# Patient Record
Sex: MALE | Race: WHITE | Employment: FULL TIME | URBAN - METROPOLITAN AREA
[De-identification: names, ages, dates, MRNs, and addresses within clinical notes are randomized per-mention and may not be internally consistent; named-entity substitution may affect disease eponyms.]

---

## 2024-06-28 ENCOUNTER — APPOINTMENT (OUTPATIENT)
Age: 29
End: 2024-06-28

## 2024-06-28 ENCOUNTER — HOSPITAL ENCOUNTER (EMERGENCY)
Age: 29
Discharge: HOME | End: 2024-06-28
Attending: EMERGENCY MEDICINE

## 2024-06-28 VITALS
DIASTOLIC BLOOD PRESSURE: 93 MMHG | HEART RATE: 89 BPM | HEIGHT: 69 IN | WEIGHT: 183 LBS | OXYGEN SATURATION: 97 % | SYSTOLIC BLOOD PRESSURE: 143 MMHG | BODY MASS INDEX: 27.11 KG/M2 | RESPIRATION RATE: 18 BRPM | TEMPERATURE: 98.4 F

## 2024-06-28 DIAGNOSIS — M25.532 WRIST PAIN, ACUTE, LEFT: Primary | ICD-10-CM

## 2024-06-28 PROCEDURE — 99283 EMERGENCY DEPT VISIT LOW MDM: CPT

## 2024-06-28 PROCEDURE — 73120 X-RAY EXAM OF HAND: CPT

## 2024-06-28 PROCEDURE — 73110 X-RAY EXAM OF WRIST: CPT

## 2024-06-28 ASSESSMENT — LIFESTYLE VARIABLES
HOW MANY STANDARD DRINKS CONTAINING ALCOHOL DO YOU HAVE ON A TYPICAL DAY: 3 OR 4
HOW OFTEN DO YOU HAVE A DRINK CONTAINING ALCOHOL: 2-3 TIMES A WEEK

## 2024-06-28 ASSESSMENT — PAIN - FUNCTIONAL ASSESSMENT: PAIN_FUNCTIONAL_ASSESSMENT: 0-10

## 2024-06-28 ASSESSMENT — PAIN DESCRIPTION - DESCRIPTORS: DESCRIPTORS: SHARP;SHOOTING

## 2024-06-28 ASSESSMENT — PAIN SCALES - GENERAL: PAINLEVEL_OUTOF10: 10

## 2024-06-28 ASSESSMENT — PAIN DESCRIPTION - LOCATION: LOCATION: WRIST

## 2024-06-28 ASSESSMENT — PAIN DESCRIPTION - ORIENTATION: ORIENTATION: LEFT

## 2024-06-29 NOTE — ED PROVIDER NOTES
I PERSONALLY SAW THE PATIENT AND PERFORMED A SUBSTANTIVE PORTION OF THE VISIT INCLUDING ALL ASPECTS OF THE MEDICAL DECISION MAKING PROCESS.    [facility] EMERGENCY DEPT  EMERGENCY DEPARTMENT ENCOUNTER      Pt Name: Kyaw Fowler  MRN: [id number]  Birthdate 1995  Date of evaluation: 6/28/2024  Provider: Amando Parmar MD    CHIEF COMPLAINT       Chief Complaint   Patient presents with    Wrist Injury     Patient was skateboarding last night and fell and injured left wrist. Patient denies hitting head.        HISTORY OF PRESENT ILLNESS    Kyaw Fowler is a 29 y.o. male who presents to the emergency department with left wrist pain.  Patient was skateboarding last night and fell on his wrist.  10 of 10 pain to the left wrist.  No other associated symptoms.  No rash.  No leg swelling.  No back pain.    Nursing Notes were reviewed. Including nursing noted for FM, Surgical History, Past Medical History, Social History, vitals, and allergies; agree with all.     REVIEW OF SYSTEMS       Review of Systems    Except as noted above the remainder of the review of systems was reviewed and negative.     PAST MEDICAL HISTORY   History reviewed. No pertinent past medical history.    SURGICAL HISTORY     History reviewed. No pertinent surgical history.    CURRENT MEDICATIONS       Previous Medications    No medications on file       ALLERGIES     Patient has no known allergies.    FAMILY HISTORY      History reviewed. No pertinent family history.    SOCIAL HISTORY       Social History     Socioeconomic History    Marital status: Single     Spouse name: None    Number of children: None    Years of education: None    Highest education level: None   Tobacco Use    Smoking status: Never     Passive exposure: Never    Smokeless tobacco: Never   Substance and Sexual Activity    Alcohol use: Yes     Comment: social    Drug use: Yes     Types: Marijuana (Weed)     Comment: occ       PHYSICAL EXAM       ED Triage Vitals  [06/28/24 2048]   BP Temp Temp Source Pulse Respirations SpO2 Height Weight - Scale   (!) 143/93 98.4 °F (36.9 °C) Oral 89 18 97 % 1.753 m (5' 9\") 83 kg (183 lb)       Physical Exam  Vitals and nursing note reviewed.   Constitutional:       General: He is not in acute distress.     Appearance: He is well-developed. He is not diaphoretic.   HENT:      Head: Normocephalic and atraumatic.   Eyes:      General:         Right eye: No discharge.         Left eye: No discharge.      Pupils: Pupils are equal, round, and reactive to light.   Neck:      Thyroid: No thyromegaly.      Trachea: No tracheal deviation.   Cardiovascular:      Rate and Rhythm: Normal rate and regular rhythm.      Heart sounds: No murmur heard.  Pulmonary:      Breath sounds: No wheezing or rales.   Chest:      Chest wall: No tenderness.   Abdominal:      General: There is no distension.      Palpations: Abdomen is soft. There is no mass.      Tenderness: There is no abdominal tenderness. There is no guarding or rebound.   Musculoskeletal:         General: Tenderness present. No deformity. Normal range of motion.      Cervical back: Normal range of motion.      Comments: 5 out of 5  strength.  Well-appearing neurovascular tact of the   Skin:     General: Skin is warm.      Coloration: Skin is not pale.      Findings: No erythema or rash.   Neurological:      Mental Status: He is alert.      Cranial Nerves: No cranial nerve deficit.      Motor: No abnormal muscle tone.      Coordination: Coordination normal.         DIAGNOSTIC RESULTS     EKG: All EKG's are interpreted by the Emergency Department Physician who either signs or Co-signs this chart in the absence of acardiologist.    Interpreted by myself     RADIOLOGY:   Non-plain film images such as CT, Ultrasoundand MRI are read by the radiologist. Plain radiographic images are visualized and preliminarily interpreted by the emergency physician with the below findings:    X-ray no fracture    ED

## 2024-07-05 ENCOUNTER — APPOINTMENT (OUTPATIENT)
Age: 29
End: 2024-07-05

## 2024-07-05 ENCOUNTER — TELEPHONE (OUTPATIENT)
Dept: ORTHOPEDIC SURGERY | Age: 29
End: 2024-07-05

## 2024-07-05 ENCOUNTER — HOSPITAL ENCOUNTER (EMERGENCY)
Age: 29
Discharge: HOME | End: 2024-07-05
Attending: STUDENT IN AN ORGANIZED HEALTH CARE EDUCATION/TRAINING PROGRAM

## 2024-07-05 VITALS
OXYGEN SATURATION: 98 % | RESPIRATION RATE: 16 BRPM | SYSTOLIC BLOOD PRESSURE: 131 MMHG | DIASTOLIC BLOOD PRESSURE: 75 MMHG | TEMPERATURE: 98 F | BODY MASS INDEX: 27.02 KG/M2 | HEIGHT: 69 IN | HEART RATE: 92 BPM

## 2024-07-05 DIAGNOSIS — M25.511 ACUTE PAIN OF RIGHT SHOULDER: Primary | ICD-10-CM

## 2024-07-05 DIAGNOSIS — S43.101A SEPARATION OF RIGHT ACROMIOCLAVICULAR JOINT, INITIAL ENCOUNTER: ICD-10-CM

## 2024-07-05 PROCEDURE — 99283 EMERGENCY DEPT VISIT LOW MDM: CPT

## 2024-07-05 PROCEDURE — 73030 X-RAY EXAM OF SHOULDER: CPT

## 2024-07-05 RX ORDER — HYDROCODONE BITARTRATE AND ACETAMINOPHEN 5; 325 MG/1; MG/1
1 TABLET ORAL EVERY 4 HOURS PRN
COMMUNITY
Start: 2024-07-04 | End: 2024-07-07

## 2024-07-05 RX ORDER — OXYCODONE HYDROCHLORIDE AND ACETAMINOPHEN 5; 325 MG/1; MG/1
1 TABLET ORAL ONCE
Status: DISCONTINUED | OUTPATIENT
Start: 2024-07-05 | End: 2024-07-05 | Stop reason: HOSPADM

## 2024-07-05 RX ORDER — KETOROLAC TROMETHAMINE 15 MG/ML
15 INJECTION, SOLUTION INTRAMUSCULAR; INTRAVENOUS ONCE
Status: DISCONTINUED | OUTPATIENT
Start: 2024-07-05 | End: 2024-07-05 | Stop reason: HOSPADM

## 2024-07-05 ASSESSMENT — PAIN DESCRIPTION - PAIN TYPE: TYPE: ACUTE PAIN

## 2024-07-05 ASSESSMENT — PAIN SCALES - GENERAL: PAINLEVEL_OUTOF10: 5

## 2024-07-05 ASSESSMENT — PAIN - FUNCTIONAL ASSESSMENT: PAIN_FUNCTIONAL_ASSESSMENT: 0-10

## 2024-07-05 NOTE — DISCHARGE INSTR - COC
Continuity of Care Form    Patient Name: Kyaw Fowler   :  1995  MRN:  [id number]    Admit date:  2024  Discharge date:  ***    Code Status Order: No Order   Advance Directives:     Admitting Physician:  No admitting provider for patient encounter.  PCP: No primary care provider on file.    Discharging Nurse: ***  Discharging Hospital Unit/Room#: 10/10  Discharging Unit Phone Number: ***    Emergency Contact:   Extended Emergency Contact Information  Primary Emergency Contact: teo landeros  Mobile Phone: [phone]  Relation: Parent    Past Surgical History:  History reviewed. No pertinent surgical history.    Immunization History:     There is no immunization history on file for this patient.    Active Problems:  There is no problem list on file for this patient.      Isolation/Infection:   Isolation            No Isolation          Patient Infection Status       None to display            Nurse Assessment:  Last Vital Signs: /75   Pulse 92   Temp 98 °F (36.7 °C)   Resp 16   Ht 1.753 m (5' 9\")   SpO2 98%   BMI 27.02 kg/m²     Last documented pain score (0-10 scale): Pain Level: 5  Last Weight:   Wt Readings from Last 1 Encounters:   24 83 kg (183 lb)     Mental Status:  {IP PT MENTAL STATUS:}    IV Access:  { TAJ IV ACCESS:983395705}    Nursing Mobility/ADLs:  Walking   {CHP DME ADLs:104354096}  Transfer  {CHP DME ADLs:449423625}  Bathing  {CHP DME ADLs:097963022}  Dressing  {CHP DME ADLs:427374771}  Toileting  {CHP DME ADLs:924487347}  Feeding  {CHP DME ADLs:851508421}  Med Admin  {CHP DME ADLs:408765830}  Med Delivery   { TAJ MED Delivery:332210367}    Wound Care Documentation and Therapy:        Elimination:  Continence:   Bowel: {YES / NO:}  Bladder: {YES / NO:}  Urinary Catheter: {Urinary Catheter:748994379}   Colostomy/Ileostomy/Ileal Conduit: {YES / NO:}       Date of Last BM: ***  No intake or output data in the 24 hours ending 24 0230  No

## 2024-07-05 NOTE — ED PROVIDER NOTES
[facility] EMERGENCY DEPT     EMERGENCY DEPARTMENT ENCOUNTER         Pt Name: Kyaw Fowler   MRN: [id number]   Birthdate 1995   Date of evaluation: 7/5/2024   Provider: Vishnu Bravo MD   PCP: No primary care provider on file.   Note Started: 2:35 AM EDT 7/5/24       Chief Complaint     Shoulder Pain      History of Present Illness     Kyaw Fowler is a 29 y.o. male right-hand-dominant who presents with right shoulder pain.  The patient sustained injury to the right shoulder yesterday in a skateboarding accident.  The patient otherwise has no major contributing past medical history.  After the initial injury he visited another emergency department where he was diagnosed with a likely complete AC separation of the right shoulder.  He was placed in a sling provided pain medications and referred to visit orthopedics for outpatient follow-up.  Patient is currently homeless living in his car.  He states that night he had been unable to  his pain medications and had worsening pain and therefore called for paramedics who brought him by ambulance to the emergency department for evaluation.  The patient has no other acute complaints    I have reviewed the nursing notes and agree unless otherwise noted.    Review of Systems     Positives and pertinent negatives as per HPI.    Past Medical, Surgical, Family, and Social History     He has no past medical history on file.  He has no past surgical history on file.  His family history is not on file.  He reports that he has never smoked. He has never been exposed to tobacco smoke. He has never used smokeless tobacco. He reports current alcohol use. He reports current drug use. Drug: Marijuana (Weed).    SCREENINGS:          [place] Coma Scale  Eye Opening: Spontaneous  Best Verbal Response: Oriented  Best Motor Response: Obeys commands  [place] Coma Scale Score: 15                        CIWA Assessment  BP: 131/75  Pulse: 92            a AC separation.  Here he is afebrile and hemodynamically stable.  The extremity is neurovascularly intact.  There is some minor tenting at the distal clavicle, no high risk features.  Remainder physical examination is benign.  The patient has yet to  his pain medications but was provided fentanyl intramuscular by EMS prior to arrival.  We reassess his shoulder with x-ray imaging which again supports a diagnosis of AC separation no other acute abnormalities.  Had a plan to provide additional pain medications however on reassessment the patient was significantly improved in his pain after medications provided by EMS and no longer required further medications.  We discussed at length next steps he does have a referral to visit with orthopedics and I have provided a second resource in this regard.  He again has a prescription for pain medications to utilize outpatient.  He is secured in a sling provided by the initial emergency department.  Overall his clinical condition was improved over the course of his observation in the emergency department and given this course there was no indication for further testing or treatment and he was discharged in good condition         Additional Reassessment    Is this patient to be included in the SEP-1 core measure? No Exclusion criteria - the patient is NOT to be included for SEP-1 Core Measure due to: Infection is not suspected    Medical Decision Making  Presentation for breakthrough pain in the context of recent AC separation of the right shoulder.  Reassuring course pain improved no high risk features appropriate for discharge continued outpatient management.    Problems Addressed:  Acute pain of right shoulder: acute illness or injury  Separation of right acromioclavicular joint, initial encounter: acute illness or injury    Amount and/or Complexity of Data Reviewed  External Data Reviewed: notes.     Details: Prior ED visit  Radiology: ordered. Decision-making details

## 2024-07-05 NOTE — TELEPHONE ENCOUNTER
LVM for patient to return call to schedule ED follow up.   Upon return call, please offer 7/9/24 at 4:00 PM at [facility]

## 2024-07-05 NOTE — DISCHARGE INSTRUCTIONS
You were evaluated in the emergency department for shoulder pain. Assessments and testing completed during your visit were reassuring and at this time there is no indication for further testing, treatment or admission to the hospital. Given this it is appropriate to discharge you from the emergency department. At the time of discharge we discussed the following:    Please  the prescription medications provided on your prior visit and be sure to visit with the orthopedic specialists early next week for reassessment and discussion of further management likely for surgery.  You should maintain the injury and the sling until cleared by orthopedics.    Please note that sometimes it is difficult to diagnose a medical condition early in the disease process before the disease is fully manifest. Because of this, should you develop any new or worsening symptoms, you may return at any time to the emergency department for another evaluation. If available you are also recommended to review this visit with your primary care physician or other medical provider in the next 7 days. Thank you for allowing us to care for you today.

## 2024-07-05 NOTE — ED TRIAGE NOTES
Pt was seen this AM and diagnosed with a \" Shoulder\" and prescribed norco. Pt has not filled the rx yet. Pt c/o pain. Given 100 mcg fentanyl IM per EMS.

## 2024-07-08 ENCOUNTER — OFFICE VISIT (OUTPATIENT)
Dept: ORTHOPEDIC SURGERY | Age: 29
End: 2024-07-08

## 2024-07-08 VITALS — HEIGHT: 69 IN | BODY MASS INDEX: 27.11 KG/M2 | WEIGHT: 183 LBS

## 2024-07-08 DIAGNOSIS — S43.101A AC SEPARATION, TYPE 5, RIGHT, INITIAL ENCOUNTER: Primary | ICD-10-CM

## 2024-07-08 DIAGNOSIS — M25.511 RIGHT SHOULDER PAIN, UNSPECIFIED CHRONICITY: ICD-10-CM

## 2024-07-08 PROCEDURE — 99204 OFFICE O/P NEW MOD 45 MIN: CPT | Performed by: ORTHOPAEDIC SURGERY

## 2024-07-08 RX ORDER — MELOXICAM 15 MG/1
15 TABLET ORAL DAILY PRN
Qty: 30 TABLET | Refills: 0 | Status: SHIPPED | OUTPATIENT
Start: 2024-07-08

## 2024-07-08 NOTE — PROGRESS NOTES
ORTHOPAEDIC SURGERY H&P / CONSULTATION NOTE    Chief complaint:   Chief Complaint   Patient presents with    Shoulder Pain     NP R SHOULDER      History of present illness: The patient is a 29 y.o. male right hand dominant with subjective symptoms of right shoulder pain. The chief complaint is located at right shoulder. Duration of symptoms has been for 4 days so that will likely. The severity of symptoms is rated at 5/10 pain as high as 10/10 pain on intake form.  Patient was seen in emergency room on 7/4/7/5 for right shoulder related injury.  He had fallen off a skateboard on 7/4/2024.  He states on again off again pain in the right shoulder.  He denies previous trauma or injury per se.  He has been utilizing a sling for the time being as well as weaning off Norco.  He denies physical therapy.  He denies injections.  He presents today for initial consultation given first clinic availability that worked for him.  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    The patient has tried the below listed items prior to today's consultation for above listed chief complaint.     -   Over-the-counter anti-inflammatories/prescription medication anti-inflammatory.     -   Physical therapy / guided home exercise program -     -   Previous corticosteroid injections    Past medical history:  No past medical history on file.     Past surgical history:  No past surgical history on file.     Allergies:  No Known Allergies      Medications:   Current Outpatient Medications:     meloxicam (MOBIC) 15 MG tablet, Take 1 tablet by mouth daily as needed for Pain Take 1 tablet by mouth daily as needed for pain, Disp: 30 tablet, Rfl: 0     Social history: Denies IV drug use.    Social History     Socioeconomic History    Marital status: Single     Spouse name: Not on file    Number of children: Not on file    Years of education: Not on file    Highest